# Patient Record
Sex: FEMALE | Race: WHITE
[De-identification: names, ages, dates, MRNs, and addresses within clinical notes are randomized per-mention and may not be internally consistent; named-entity substitution may affect disease eponyms.]

---

## 2017-05-16 ENCOUNTER — HOSPITAL ENCOUNTER (OUTPATIENT)
Dept: HOSPITAL 17 - HSDC | Age: 57
Setting detail: OBSERVATION
LOS: 1 days | Discharge: HOME | End: 2017-05-17
Attending: NEUROLOGICAL SURGERY | Admitting: NEUROLOGICAL SURGERY
Payer: COMMERCIAL

## 2017-05-16 VITALS
OXYGEN SATURATION: 94 % | TEMPERATURE: 97 F | HEART RATE: 64 BPM | RESPIRATION RATE: 17 BRPM | DIASTOLIC BLOOD PRESSURE: 83 MMHG | SYSTOLIC BLOOD PRESSURE: 117 MMHG

## 2017-05-16 VITALS
TEMPERATURE: 96 F | RESPIRATION RATE: 16 BRPM | SYSTOLIC BLOOD PRESSURE: 120 MMHG | DIASTOLIC BLOOD PRESSURE: 67 MMHG | OXYGEN SATURATION: 97 % | HEART RATE: 95 BPM

## 2017-05-16 VITALS
SYSTOLIC BLOOD PRESSURE: 126 MMHG | HEART RATE: 91 BPM | DIASTOLIC BLOOD PRESSURE: 70 MMHG | OXYGEN SATURATION: 96 % | RESPIRATION RATE: 18 BRPM | TEMPERATURE: 96.4 F

## 2017-05-16 VITALS
TEMPERATURE: 98 F | DIASTOLIC BLOOD PRESSURE: 78 MMHG | SYSTOLIC BLOOD PRESSURE: 122 MMHG | HEART RATE: 78 BPM | RESPIRATION RATE: 20 BRPM | OXYGEN SATURATION: 100 %

## 2017-05-16 VITALS
HEART RATE: 76 BPM | DIASTOLIC BLOOD PRESSURE: 77 MMHG | RESPIRATION RATE: 16 BRPM | OXYGEN SATURATION: 92 % | TEMPERATURE: 96.6 F | SYSTOLIC BLOOD PRESSURE: 117 MMHG

## 2017-05-16 VITALS — HEIGHT: 67 IN | BODY MASS INDEX: 35.64 KG/M2 | WEIGHT: 227.08 LBS

## 2017-05-16 DIAGNOSIS — Z88.0: ICD-10-CM

## 2017-05-16 DIAGNOSIS — M50.10: Primary | ICD-10-CM

## 2017-05-16 DIAGNOSIS — Z88.5: ICD-10-CM

## 2017-05-16 DIAGNOSIS — G25.81: ICD-10-CM

## 2017-05-16 DIAGNOSIS — Z88.1: ICD-10-CM

## 2017-05-16 DIAGNOSIS — F32.9: ICD-10-CM

## 2017-05-16 DIAGNOSIS — E03.9: ICD-10-CM

## 2017-05-16 PROCEDURE — 97161 PT EVAL LOW COMPLEX 20 MIN: CPT

## 2017-05-16 PROCEDURE — 76000 FLUOROSCOPY <1 HR PHYS/QHP: CPT

## 2017-05-16 PROCEDURE — C1713 ANCHOR/SCREW BN/BN,TIS/BN: HCPCS

## 2017-05-16 PROCEDURE — 72040 X-RAY EXAM NECK SPINE 2-3 VW: CPT

## 2017-05-16 PROCEDURE — 22856 TOT DISC ARTHRP 1NTRSPC CRV: CPT

## 2017-05-16 PROCEDURE — G0378 HOSPITAL OBSERVATION PER HR: HCPCS

## 2017-05-16 PROCEDURE — L0150 CERV SEMI-RIG ADJ MOLDED CHN: HCPCS

## 2017-05-16 PROCEDURE — L0172 CERV COL SR FOAM 2PC PRE OTS: HCPCS

## 2017-05-16 PROCEDURE — 94150 VITAL CAPACITY TEST: CPT

## 2017-05-16 RX ADMIN — SODIUM CHLORIDE, PRESERVATIVE FREE SCH ML: 5 INJECTION INTRAVENOUS at 21:00

## 2017-05-16 RX ADMIN — VANCOMYCIN HYDROCHLORIDE SCH MLS/HR: 1 INJECTION, SOLUTION INTRAVENOUS at 07:25

## 2017-05-16 RX ADMIN — CYCLOBENZAPRINE HYDROCHLORIDE SCH MG: 10 TABLET, FILM COATED ORAL at 17:48

## 2017-05-16 RX ADMIN — VANCOMYCIN HYDROCHLORIDE SCH: 1 INJECTION, SOLUTION INTRAVENOUS at 07:15

## 2017-05-16 RX ADMIN — DEXAMETHASONE SODIUM PHOSPHATE SCH MG: 4 INJECTION, SOLUTION INTRAMUSCULAR; INTRAVENOUS at 22:55

## 2017-05-16 RX ADMIN — CYCLOBENZAPRINE HYDROCHLORIDE SCH MG: 10 TABLET, FILM COATED ORAL at 14:25

## 2017-05-16 RX ADMIN — HYDROMORPHONE HYDROCHLORIDE PRN MG: 1 INJECTION, SOLUTION INTRAMUSCULAR; INTRAVENOUS; SUBCUTANEOUS at 22:54

## 2017-05-16 RX ADMIN — HYDROCODONE BITARTRATE AND ACETAMINOPHEN PRN TAB: 10; 325 TABLET ORAL at 14:26

## 2017-05-16 RX ADMIN — DOCUSATE SODIUM SCH MG: 100 CAPSULE, LIQUID FILLED ORAL at 20:26

## 2017-05-16 RX ADMIN — SODIUM CHLORIDE AND POTASSIUM CHLORIDE SCH MLS/HR: 9; 1.49 INJECTION, SOLUTION INTRAVENOUS at 20:29

## 2017-05-16 RX ADMIN — HYDROCODONE BITARTRATE AND ACETAMINOPHEN PRN TAB: 10; 325 TABLET ORAL at 17:49

## 2017-05-16 RX ADMIN — HYDROMORPHONE HYDROCHLORIDE PRN MG: 1 INJECTION, SOLUTION INTRAMUSCULAR; INTRAVENOUS; SUBCUTANEOUS at 20:27

## 2017-05-16 RX ADMIN — SODIUM CHLORIDE AND POTASSIUM CHLORIDE SCH MLS/HR: 9; 1.49 INJECTION, SOLUTION INTRAVENOUS at 12:18

## 2017-05-16 RX ADMIN — DEXAMETHASONE SODIUM PHOSPHATE SCH MG: 4 INJECTION, SOLUTION INTRAMUSCULAR; INTRAVENOUS at 13:00

## 2017-05-16 RX ADMIN — CEFAZOLIN SODIUM SCH MLS/HR: 2 SOLUTION INTRAVENOUS at 15:45

## 2017-05-16 RX ADMIN — DEXAMETHASONE SODIUM PHOSPHATE SCH MG: 4 INJECTION, SOLUTION INTRAMUSCULAR; INTRAVENOUS at 17:50

## 2017-05-16 RX ADMIN — ONDANSETRON PRN MG: 2 INJECTION, SOLUTION INTRAMUSCULAR; INTRAVENOUS at 20:27

## 2017-05-16 NOTE — PD.OP
__________________________________________________





Operative Report


Date of Surgery:  May 16, 2017


Preoperative Diagnosis:  


C3-4 cervical disk herniation


Postoperative Diagnosis:  


C3-4 cervical disk herniation


Procedure:


C3-C4 Anterior cervical discectomy and arhtoplasty with Mobi C


Anesthesia:


General


Surgeon:


Cezar Dorado


Assistant(s):


Lindy Falcon


Operation and Findings:





INDICATIONS FOR THE SURGERY


Ms Barth is a 56 year-old female who presented with intractable neck pain and 

clinical evidence of C7 cervical radiculopathy. She was found to have focal 

stenosis with a C6-7 causing significant mass effect on the nerve roots. She 

failed multiple modalities of nonsurgical treatment including physical therapy, 

analgesics, antiinflammatories, and pain management with multiple epidural 

steroid injections. The severity of her pain and symptoms were affecting her 

quality of life. An anterior cervical discectomy and arthroplasty were 

indicated.





The step-by-step details of the surgical procedure, indications, alternatives, 

risks and potential complications were fully discussed with the patient.  The 

patient fully understood.  All his questions were answered.  No guarantees were 

given.  She voiced requesting the procedure and signed informed consents.  She 

was offered the alternative of delaying the procedure and continuing with 

nonsurgical management.











DETAILS OF THE SURGICAL PROCEDURE





SURGICAL APPROACH


A skin incision was made along the middle cervical crease on the left side with 

a #10 blade.  The dissection was carried out through the platysma exposing the 

sternocleidomastoid muscle.  The cervical spine was approached following the 

fascial layers of the neck, just medial to the anterior border of the 

sternocleidomastoid and carotid sheath by a combination of sharp and dull 

dissection.  The omohyoid muscle was identified and carefully dissected 

laterally and the deep cervical fascia was carefully opened.  The longus colli 

muscles were retracted to each side of the midline. A marker was placed at the 

C3-C4 disc space and a cross-table lateral x-ray performed with a C-arm. An AP 

xray was then obtained as well, and the midline of the disk space was defined.





SURGICAL DECOMPRESSION


In order to decompress the anterior surface of the spinal cord it was necessary 

to perform a microsurgical resection of the disk. At this point in the 

procedure the operating microscope was draped in the usual sterile fashion and 

brought to the field.  The rest of the surgical procedure was performed using 

microdissection technique with the exception of the closure.





Under the operative microscopic a self-retaining retractor was placed 

underneath the longus colli muscle.  The annulus was incised with a #15 blade 

and microdiscectomy was then carefully carried out using angled curets and 

pituitary forceps. The patient had a large right disk extrusion which was 

producing mass affect on the exiting nerve root.  This was carefully dissected 

with a nerve hock and resected with a think foot plate 2 mm Kerrison under high 

magnification.  The posterior longitudinal ligament was then elevated with an 

angled curet and incised with a 15 bladed knife. A careful resection of the 

posterior longitudinal ligament was carried out using a thin footplate 2 mm 

Kerrison. Extruded disk fragments were causing mechanical compression over the 

exiting C6 nerve root were carefully dissected. The decompression was then 

carried out laterally, and a bilateral foraminotomy was performed with a 2 mm 

thin foot Kerrison. The epidural space was the systematically assessed with a 

nerve hook in search for disk fragments of scar tissue.  An excellent 

decompression was achieved in both, the dural sac and bilateral exiting nerve 

roots. The incision was then irrigated with a large amount of antibiotic 

solution





INTERBODY ARTHROPLASTY


In order to avoid collapse of the disk space which would result in bilateral 

foraminal stenosis, and in order to maintain disk space height and function 

minimally development of adjacent level degeneration, it was necessary to place 

an interbody device.





At this point of the procedure, gentle distraction was applied. The size of the 

interbody device was then assessed using a trial, and a cross table xray was 

done for confirmation of appropriate size and position of the device. Then the 

disk space was irrigated with antibiotic solution, and a 15mm by 6mm Mobi C 

artificial disk was carefully impacted into the disc space C3-C4. An excellent 

position of the device was achieved. This was confirmed anatomically by feeling 

the space posterior to the implant and distance to the anterior surface of the 

dural sac. Radiological confirmation of the position was performed with a cross 

table AP and lateral X-ray views, performed with the C-arm.





COMPLETION OF THE SURGICAL PROCEDURE





Once that each interbody device was in an appropriate position, the distraction 

was discontinued. The position of the device as well  as alignment of the spine 

were assessed anatomically by direct visualization, and radiologically by 

performing an AP and lateral X-ray of the cervical spine with the C-arm. The 

position of the implant was excellent.





The incision was irrigated with several liters of antibiotic solution. 

Hemostasis was achieved with a bipolar. A 7 mm Zana-Darling drain was left in 

the prevertebral space and externalized through a separate stab incision.  The 

incision was then closed in layers.  3-0 Vicryl with interrupted sutures was 

used to close the platysma and subcutaneous tissue. The skin was closed with 4-

0 running subcuticular Vicryl and Dermabond was applied to the skin.  The drain 

was secured with a 3-0 nylon.





At the end of the procedure the sponge, needle and instrument counts were all 

correct.  The estimated blood loss was less than 30 cc.  No blood transfusion 

was given.  No intraoperative complications occurred.  The patient received 

prophylactic antibiotics.  The patient was then extubated and transferred to 

the recovery room in stable condition.








Cezar Dorado MD May 16, 2017 13:26

## 2017-05-16 NOTE — RADRPT
EXAM DATE/TIME:  05/16/2017 09:51 

 

[facility] COMPARISON:     

No previous studies available for comparison.

 

                     

INDICATIONS :     

Artificial disk C3,C4.

                     

 

MEDICAL HISTORY :     

None.          

 

SURGICAL HISTORY :       

Fusion, cervical. 

 

ENCOUNTER:     

Initial                                        

 

ACUITY:     

1 day      

 

PAIN SCORE:     

Non-responsive.

 

LOCATION:       

Cervical spine.

 

FINDINGS:     

Artificial disc is present at C3-C4 in anatomic alignment. There is previous fusion at C5-C6.

 

CONCLUSION:     

1. Postsurgical changes as above.

 

 

 

 Ulices Mike MD on May 16, 2017 at 17:03           

Board Certified Radiologist.

 This report was verified electronically.

## 2017-05-17 VITALS
RESPIRATION RATE: 18 BRPM | TEMPERATURE: 98.2 F | SYSTOLIC BLOOD PRESSURE: 123 MMHG | OXYGEN SATURATION: 95 % | DIASTOLIC BLOOD PRESSURE: 75 MMHG | HEART RATE: 89 BPM

## 2017-05-17 VITALS
TEMPERATURE: 98.1 F | DIASTOLIC BLOOD PRESSURE: 87 MMHG | SYSTOLIC BLOOD PRESSURE: 130 MMHG | HEART RATE: 75 BPM | OXYGEN SATURATION: 98 % | RESPIRATION RATE: 16 BRPM

## 2017-05-17 VITALS
SYSTOLIC BLOOD PRESSURE: 121 MMHG | OXYGEN SATURATION: 94 % | TEMPERATURE: 97.6 F | RESPIRATION RATE: 18 BRPM | HEART RATE: 90 BPM | DIASTOLIC BLOOD PRESSURE: 79 MMHG

## 2017-05-17 RX ADMIN — DOCUSATE SODIUM SCH MG: 100 CAPSULE, LIQUID FILLED ORAL at 08:55

## 2017-05-17 RX ADMIN — HYDROMORPHONE HYDROCHLORIDE PRN MG: 1 INJECTION, SOLUTION INTRAMUSCULAR; INTRAVENOUS; SUBCUTANEOUS at 00:57

## 2017-05-17 RX ADMIN — CEFAZOLIN SODIUM SCH MLS/HR: 2 SOLUTION INTRAVENOUS at 00:57

## 2017-05-17 RX ADMIN — HYDROMORPHONE HYDROCHLORIDE PRN MG: 1 INJECTION, SOLUTION INTRAMUSCULAR; INTRAVENOUS; SUBCUTANEOUS at 06:10

## 2017-05-17 RX ADMIN — DEXAMETHASONE SODIUM PHOSPHATE SCH MG: 4 INJECTION, SOLUTION INTRAMUSCULAR; INTRAVENOUS at 06:10

## 2017-05-17 RX ADMIN — CEFAZOLIN SODIUM SCH MLS/HR: 2 SOLUTION INTRAVENOUS at 08:56

## 2017-05-17 RX ADMIN — HYDROMORPHONE HYDROCHLORIDE PRN MG: 1 INJECTION, SOLUTION INTRAMUSCULAR; INTRAVENOUS; SUBCUTANEOUS at 03:32

## 2017-05-17 RX ADMIN — SODIUM CHLORIDE, PRESERVATIVE FREE SCH ML: 5 INJECTION INTRAVENOUS at 08:57

## 2017-05-17 RX ADMIN — ONDANSETRON PRN MG: 2 INJECTION, SOLUTION INTRAMUSCULAR; INTRAVENOUS at 03:32

## 2017-05-17 RX ADMIN — HYDROCODONE BITARTRATE AND ACETAMINOPHEN PRN TAB: 10; 325 TABLET ORAL at 08:55

## 2017-05-17 RX ADMIN — SODIUM CHLORIDE AND POTASSIUM CHLORIDE SCH MLS/HR: 9; 1.49 INJECTION, SOLUTION INTRAVENOUS at 08:58

## 2017-05-17 RX ADMIN — DEXAMETHASONE SODIUM PHOSPHATE SCH MG: 4 INJECTION, SOLUTION INTRAMUSCULAR; INTRAVENOUS at 12:18

## 2017-05-17 RX ADMIN — CYCLOBENZAPRINE HYDROCHLORIDE SCH MG: 10 TABLET, FILM COATED ORAL at 12:18

## 2017-05-17 RX ADMIN — CYCLOBENZAPRINE HYDROCHLORIDE SCH MG: 10 TABLET, FILM COATED ORAL at 08:56

## 2017-05-17 NOTE — HHI.DS
Discharge Summary


Admission Date


May 16, 2017 at 12:21


Discharge Date:  May 17, 2017


Admitting Diagnosis


hnp





(1) Status post cervical arthrodesis


ICD Code:  Z98.1


Procedures


arthroplasty


Brief History


elective arthroplasty, no complications


Hospital Course


Pt recovered without complications


Pt Condition on Discharge:  Good


Discharge Disposition:  Discharge Home


Discharge Instructions


DIET: Follow Instructions for:  As Tolerated, No Restrictions


Speech Therapy-Diet Recommenda:  Mechanical Soft


ADDITIONAL Diet Instructions:  


no


ACTIVITIES You can perform:  Weight Bearing As Michael


Activities to Avoid:  Lifting/Bending








Cezar Dorado MD May 17, 2017 09:29

## 2017-05-17 NOTE — HHI.NSPN
__________________________________________________





Note Status


Status:  Progress Note





Interval History


Diagnosis


ARthroplasty


Interval History


5/17. Doing well, S?P arthroplasty. doing well





Labs, Micro, & Vital Signs


Results











  Date Time  Temp Pulse Resp B/P Pulse Ox O2 Delivery O2 Flow Rate FiO2


 


5/17/17 04:30 98.1 75 16 130/87 98   


 


5/17/17 04:00      Nasal Cannula 2.00 


 


5/17/17 00:00      Nasal Cannula 2.00 


 


5/16/17 23:50 96.6 76 16 117/77 92   


 


5/16/17 20:50 97.0 64 17 117/83 94   


 


5/16/17 20:00      Nasal Cannula 2.00 


 


5/16/17 16:00 96.4 91 18 126/70 96   


 


5/16/17 14:20 96.0 95 16 120/67 97   


 


5/16/17 14:10      Nasal Cannula 2.00 


 


5/16/17 13:52 97.7 92 20 143/76 98 Nasal Cannula 2 


 


5/16/17 13:45  92 20 143/76 98 Nasal Cannula 2 


 


5/16/17 13:30  89 20 146/80 98 Nasal Cannula 2 


 


5/16/17 13:15  86 20 143/79 99 Nasal Cannula 2 


 


5/16/17 13:00  92 20 143/82 99 Nasal Cannula 2 


 


5/16/17 12:45  87 20 152/85 99 Nasal Cannula 2 


 


5/16/17 12:30  87 20 153/87 99 Nasal Cannula 2 


 


5/16/17 12:15  88 20 152/85 99 Nasal Cannula 2 


 


5/16/17 11:56 98.1 94 20 145/87 98 Nasal Cannula 2 














 5/17/17





 07:00


 


Intake Total 4210 ml


 


Output Total 1320 ml


 


Balance 2890 ml








Constitutional





Vital Signs








  Date Time  Temp Pulse Resp B/P Pulse Ox O2 Delivery O2 Flow Rate FiO2


 


5/17/17 04:30 98.1 75 16 130/87 98   


 


5/17/17 04:00      Nasal Cannula 2.00 


 


5/17/17 00:00      Nasal Cannula 2.00 


 


5/16/17 23:50 96.6 76 16 117/77 92   


 


5/16/17 20:50 97.0 64 17 117/83 94   


 


5/16/17 20:00      Nasal Cannula 2.00 


 


5/16/17 16:00 96.4 91 18 126/70 96   


 


5/16/17 14:20 96.0 95 16 120/67 97   


 


5/16/17 14:10      Nasal Cannula 2.00 


 


5/16/17 13:52 97.7 92 20 143/76 98 Nasal Cannula 2 


 


5/16/17 13:45  92 20 143/76 98 Nasal Cannula 2 


 


5/16/17 13:30  89 20 146/80 98 Nasal Cannula 2 


 


5/16/17 13:15  86 20 143/79 99 Nasal Cannula 2 


 


5/16/17 13:00  92 20 143/82 99 Nasal Cannula 2 


 


5/16/17 12:45  87 20 152/85 99 Nasal Cannula 2 


 


5/16/17 12:30  87 20 153/87 99 Nasal Cannula 2 


 


5/16/17 12:15  88 20 152/85 99 Nasal Cannula 2 


 


5/16/17 11:56 98.1 94 20 145/87 98 Nasal Cannula 2 














 5/17/17





 07:00


 


Intake Total 4210 ml


 


Output Total 1320 ml


 


Balance 2890 ml











Review of Systems/Exam


Exam


She is alert, awake and oriented to time, place and person.  Speech is fluent.





Cranial nerve examination: pupils to be equal, round and reactive to light.  

Extra-ocular movements are intact.


Facial motor and sensory function are normal and symmetrical.  Gross hearing 

appears intact.


Sternocleidomastoid and trapezius muscles are symmetrical.  Other cranial 

nerves are intact.





Neck is soft on [place] collar





Muscle strength is normal in all muscle groups of both upper and lower 

extremities.





Sensory examination is intact to light touch and pin prick in both the upper 

and lower extremities.





Deep tendon reflexes are symmetrical in both upper and lower extremities.  

There is a bilateral plantar flexion response.





Cerebellar examination is unremarkable, without deficits.





Medications


Current Medications





 Current Medications


Lactated Ringer's 1,000 ml @  30 mls/hr Q24H PRN IV SEE LABEL COMMENTS Last 

administered on 5/16/17at 07:07;  Start 5/16/17 at 07:00;  Stop 5/16/17 at 12:31

;  Status DC


Sodium Chloride ( ml Inj) 500 ml @  30 mls/hr [id number] PRN IV SEE LABEL 

COMMENTS;  Start 5/16/17 at 07:00;  Stop 5/16/17 at 12:31;  Status DC


Metoprolol Tartrate (Lopressor) 25 mg ON CALL  PRN PO SEE LABEL COMMENTS;  

Start 5/16/17 at 07:00;  Stop 5/17/17 at 14:19;  Status DC


Povidone Iodine (Betadine 5% Antisepsis Kit) 1 applic ON CALL  PRN EACH NARE 

SEE LABEL COMMENTS Last administered on 5/16/17at 07:07;  Start 5/16/17 at 07:00

;  Stop 5/17/17 at 14:19;  Status DC


Chlorhexidine Gluconate (Chlorhexidine 2% Cloth) 3 pack ON CALL  PRN TOPICAL 

SEE LABEL COMMENTS;  Start 5/16/17 at 07:00;  Stop 5/17/17 at 14:19;  Status DC


Insulin Human Regular See Protocol Table ... ON CALL  PRN SQ SEE PROTOCOL TABLE

;  Start 5/16/17 at 07:00;  Stop 5/17/17 at 14:19;  Status DC


Sodium Chloride 1,000 ml @  30 mls/hr Q24H IV ;  Start 5/16/17 at 07:00;  Stop 5 /16/17 at 12:31;  Status DC


Vancomycin HCl/ Sodium Chloride (Vancomycin Inj/  ml Inj) 250 ml @  250 

mls/hr ON  CALL IV  Last administered on 5/16/17at 07:15;  Start 5/16/17 at 07:

00;  Stop 5/16/17 at 12:33;  Status DC


Microfibriller Collagen Hemostat (Avitene Bandage) 1 bandage STK-MED ONCE 

.ROUTE  Last administered on 5/16/17at 10:25;  Start 5/16/17 at 07:22;  Stop 5/ 16/17 at 07:23;  Status DC


Thrombin 73137 units 10,000 units STK-MED ONCE .ROUTE  Last administered on 5/16 /17at 10:25;  Start 5/16/17 at 07:22;  Stop 5/16/17 at 07:23;  Status DC


Cefazolin Sodium/ Dextrose (Ancef 2 Gm Premix) 50 ml @ As Directed STK-MED ONCE 

.ROUTE  Last administered on 5/16/17at 09:14;  Start 5/16/17 at 07:22;  Stop 5/ 16/17 at 07:23;  Status DC


Gelatin (Gelfoam 100 Top) 1 foam STK-MED ONCE .ROUTE  Last administered on 5/16/ 17at 10:24;  Start 5/16/17 at 07:22;  Stop 5/16/17 at 07:23;  Status DC


Gentamicin Sulfate (Gentamicin Inj) 240 mg STK-MED ONCE .ROUTE  Last 

administered on 5/16/17at 10:24;  Start 5/16/17 at 07:23;  Stop 5/16/17 at 07:24

;  Status DC


Acetaminophen (Ofirmev Inj) 1,000 mg STK-MED ONCE IV ;  Start 5/16/17 at 08:05;

  Stop 5/16/17 at 08:06;  Status DC


Artificial Tears (Lacrilube Opht Oint) 3.5 applic STK-MED ONCE .ROUTE ;  Start 5 /16/17 at 08:05;  Stop 5/16/17 at 08:06;  Status DC


Famotidine (Pepcid Inj) 20 mg STK-MED ONCE .ROUTE ;  Start 5/16/17 at 08:05;  

Stop 5/16/17 at 08:06;  Status DC


Midazolam HCl (Versed Inj) 4 mg STK-MED ONCE .ROUTE ;  Start 5/16/17 at 08:06;  

Stop 5/16/17 at 08:07;  Status DC


Fentanyl Citrate (fentaNYL INJ) 500 mcg STK-MED ONCE .ROUTE ;  Start 5/16/17 at 

08:06;  Stop 5/16/17 at 08:07;  Status DC


Midazolam HCl (Versed Inj) 2 mg STK-MED ONCE .ROUTE ;  Start 5/16/17 at 08:32;  

Stop 5/16/17 at 08:33;  Status DC


Miscellaneous Information ALL NURSING DEPARTME... UNSCH  PRN .XX SEE LABEL 

COMMENTS;  Start 5/16/17 at 12:15;  Stop 5/17/17 at 12:14;  Status DC


Potassium Chloride/Sodium Chloride (NS + KCl 20 Meq Inj) 1,000 ml @  100 mls/hr 

Q10H IV  Last administered on 5/17/17at 08:58;  Start 5/16/17 at 12:18;  Stop 5/ 17/17 at 14:19;  Status DC


IV Flush (NS Flush) 2 ml UNSCH  PRN IVF FLUSH AFTER USING IV ACCESS;  Start 5/16 /17 at 12:30;  Stop 5/17/17 at 14:19;  Status DC


IV Flush 2 ml 2 ml BID IVF ;  Start 5/16/17 at 21:00;  Stop 5/17/17 at 14:19;  

Status DC


Cefazolin Sodium/ Dextrose (Ancef 2 Gm Premix) 50 ml @  100 mls/hr Q8H IV  Last 

administered on 5/17/17at 08:56;  Start 5/16/17 at 17:00;  Stop 5/17/17 at 09:29

;  Status DC


Lorazepam (Ativan Inj) 1 mg Q1H  PRN IVP anxiety/irritability;  Start 5/16/17 

at 12:30;  Stop 5/17/17 at 14:19;  Status DC


Docusate Sodium (Colace) 100 mg BID PO  Last administered on 5/17/17at 08:55;  

Start 5/16/17 at 21:00;  Stop 5/17/17 at 14:19;  Status DC


Pantoprazole Sodium (Protonix) 40 mg DAILY PO  Last administered on 5/17/17at 08

:55;  Start 5/17/17 at 09:00;  Stop 5/17/17 at 14:19;  Status DC


Acetaminophen/ Hydrocodone Bitart (Norco  Mg) 1 tab Q4H  PRN PO PAIN 

SCALE 1 TO 5 Last administered on 5/17/17at 08:55;  Start 5/16/17 at 12:30;  

Stop 5/17/17 at 14:19;  Status DC


Acetaminophen/ Hydrocodone Bitart (Norco  Mg) 2 tab Q4H  PRN PO PAIN 

SCALE 6 TO 10;  Start 5/16/17 at 12:30;  Stop 5/17/17 at 14:19;  Status DC


Morphine Sulfate (Morphine Inj) 2 mg Q2H  PRN IV PUSH PAIN SCALE 1 TO 6;  Start 

5/16/17 at 12:30;  Stop 5/17/17 at 14:19;  Status DC


Morphine Sulfate (Morphine Inj) 4 mg Q2H  PRN IV PUSH PAIN SCALE 7 TO 10;  

Start 5/16/17 at 12:30;  Stop 5/17/17 at 14:19;  Status DC


Acetaminophen (Tylenol) 650 mg Q4H  PRN PO TEMPERATURE > 101.5 F;  Start 5/16/ 17 at 12:30;  Stop 5/17/17 at 14:19;  Status DC


Dexamethasone Sodium Phosphate (Decadron Inj) 4 mg Q6HR IV PUSH  Last 

administered on 5/17/17at 12:18;  Start 5/16/17 at 13:00;  Stop 5/17/17 at 14:19

;  Status DC


Alprazolam (Xanax) 1 mg HS PO  Last administered on 5/16/17at 20:27;  Start 5/16 /17 at 21:00;  Stop 5/17/17 at 14:19;  Status DC


Citalopram Hydrobromide (CeleXA) 20 mg DAILY PO  Last administered on 5/17/17at 

08:55;  Start 5/17/17 at 09:00;  Stop 5/17/17 at 14:19;  Status DC


Cyclobenzaprine HCl (Flexeril) 10 mg TID PO  Last administered on 5/17/17at 12:

18;  Start 5/16/17 at 13:00;  Stop 5/17/17 at 14:19;  Status DC


Ropinirole HCl (Requip) 1 mg QID PO  Last administered on 5/17/17at 12:18;  

Start 5/16/17 at 13:00;  Stop 5/17/17 at 14:19;  Status DC


Sumatriptan Succinate (Imitrex) 100 mg ONCE  PRN PO MIGRAINE HEADACHE;  Start 5/ 16/17 at 12:30;  Stop 5/17/17 at 14:19;  Status DC


Non-Formulary Medication 137 mcg DAILY PO THY;  Start 5/17/17 at 09:00;  Status 

UNV


Hydromorphone HCl (*DILAUDID PF INJ PERIprocedural ONLY) 1 mg STK-MED ONCE 

.ROUTE  Last administered on 5/16/17at 12:52;  Start 5/16/17 at 12:52;  Stop 5/ 16/17 at 12:53;  Status DC


Levothyroxine Sodium (Synthroid) 112 mcg DAILY@06 PO  Last administered on 5/17/ 17at 06:08;  Start 5/17/17 at 06:00;  Stop 5/17/17 at 14:19;  Status DC


Levothyroxine Sodium (Synthroid) 25 mcg DAILY@06 PO  Last administered on 5/17/ 17at 06:08;  Start 5/17/17 at 06:00;  Stop 5/17/17 at 14:19;  Status DC


Hydromorphone HCl (*DILAUDID PF INJ PERIprocedural ONLY) 1 mg STK-MED ONCE 

.ROUTE  Last administered on 5/16/17at 13:07;  Start 5/16/17 at 13:07;  Stop 5/ 16/17 at 13:08;  Status DC


Hydromorphone HCl (Dilaudid Pf Inj) 1 mg Q2H  PRN IV PAIN Last administered on 5 /17/17at 06:10;  Start 5/16/17 at 20:30;  Stop 5/17/17 at 14:19;  Status DC


Ondansetron HCl (Zofran Inj) 4 mg Q6H  PRN IV PUSH NAUSEA Last administered on 5 /17/17at 03:32;  Start 5/16/17 at 20:30;  Stop 5/17/17 at 14:19;  Status DC


Sumatriptan Succinate (Imitrex) 100 mg ONCE  ONCE PO  Last administered on 5/17/ 17at 09:19;  Start 5/17/17 at 09:00;  Stop 5/17/17 at 09:03;  Status DC





Attending Statement


She is doing well. No complications. No deficits.


Will discharge home








eCzar Dorado MD May 17, 2017 09:31

## 2018-02-21 ENCOUNTER — HOSPITAL ENCOUNTER (EMERGENCY)
Dept: HOSPITAL 17 - PHED | Age: 58
Discharge: HOME | End: 2018-02-21
Payer: COMMERCIAL

## 2018-02-21 VITALS — WEIGHT: 220.46 LBS | HEIGHT: 67 IN | BODY MASS INDEX: 34.6 KG/M2

## 2018-02-21 VITALS
HEART RATE: 69 BPM | RESPIRATION RATE: 18 BRPM | OXYGEN SATURATION: 100 % | DIASTOLIC BLOOD PRESSURE: 81 MMHG | SYSTOLIC BLOOD PRESSURE: 136 MMHG | TEMPERATURE: 98 F

## 2018-02-21 VITALS — SYSTOLIC BLOOD PRESSURE: 136 MMHG | DIASTOLIC BLOOD PRESSURE: 82 MMHG

## 2018-02-21 VITALS — RESPIRATION RATE: 14 BRPM

## 2018-02-21 DIAGNOSIS — Z88.1: ICD-10-CM

## 2018-02-21 DIAGNOSIS — M46.90: ICD-10-CM

## 2018-02-21 DIAGNOSIS — Z88.0: ICD-10-CM

## 2018-02-21 DIAGNOSIS — M54.5: Primary | ICD-10-CM

## 2018-02-21 DIAGNOSIS — F32.9: ICD-10-CM

## 2018-02-21 DIAGNOSIS — Z98.84: ICD-10-CM

## 2018-02-21 DIAGNOSIS — Z88.5: ICD-10-CM

## 2018-02-21 PROCEDURE — 96375 TX/PRO/DX INJ NEW DRUG ADDON: CPT

## 2018-02-21 PROCEDURE — 96374 THER/PROPH/DIAG INJ IV PUSH: CPT

## 2018-02-21 PROCEDURE — 96361 HYDRATE IV INFUSION ADD-ON: CPT

## 2018-02-21 PROCEDURE — 72100 X-RAY EXAM L-S SPINE 2/3 VWS: CPT

## 2018-02-21 PROCEDURE — 99284 EMERGENCY DEPT VISIT MOD MDM: CPT

## 2018-02-21 NOTE — PD
HPI


Chief Complaint:  Back/ Neck Pain or Injury


Time Seen by Provider:  04:36


Travel History


International Travel<30 days:  No


Contact w/Intl Traveler<30days:  No


Traveled to known affect area:  No





History of Present Illness


HPI


The patient is a 57-year-old  female who presents to the emergency 

department for low back pain.  The patient states her back pain started 2 days 

ago, she thought she may have "pulled" her back.  However, she tried to get up 

to go to the restroom earlier tonight and had difficulty walking.  The pain is 

located lower aspect of the back, across the sacroiliac area radiating down the 

legs but stopping prior to the knee.  She denies any weakness, numbness, or 

tingling of the lower extremities.  She does have a history of herniated disc 

in the neck before with previous surgery by Dr. Dorado.  She does note mild 

urinary incontinence, however, this has been somewhat chronic is not acute.  

The patient's symptoms are moderate, there are no current alleviating or 

exacerbating factors.  She does note a history of allergies to morphine with 

rash and states that Toradol has an interaction with her Requip, making her 

restless.  Patient states she is able to take Demerol and Dilaudid for pain.





PFSH


Past Medical History


Arthritis:  Yes (BACK AND NECK)


Blood Disorders:  No


Anxiety:  Yes


Depression:  Yes ("MOOD SWINGS")


Heart Rhythm Problems:  No


Cancer:  No


Cardiovascular Problems:  No


High Cholesterol:  No


Congestive Heart Failure:  No


Cerebrovascular Accident:  No


Diabetes:  No


Diminished Hearing:  No


Endocrine:  Yes


Gastrointestinal Disorders:  Yes (GASTRIC BYPASS SX )


Glaucoma:  No


Genitourinary:  No


Hepatitis:  No


Hiatal Hernia:  No


Hypertension:  No


Immune Disorder:  No


Implanted Vascular Access Dvce:  No


Medical other:  Yes


Musculoskeletal:  Yes (ARTHRITIS HIPS, LUMBAR SPINE, CERVICAL SPINE, FEET)


Neurologic:  Yes (NUMBNESS & BURNING ENTIRE RIGHT ARM INTO FINGERS INTERMITT)


Psychiatric:  Yes (DEPRESSION )


Reproductive:  No


Respiratory:  No


Seizures:  No


Thyroid Disease:  Yes (HYPO)


Tetanus Vaccination:  < 5 Years


Influenza Vaccination:  Yes


Pregnant?:  Not Pregnant


Menopausal:  Yes


:  2


Para:  2


Tubal Ligation:  Yes





Past Surgical History


Abdominal Surgery:  Yes (GASTRIC BYPASS )


AICD:  No


Body Medical Devices:  fussion to cervical


Cardiac Surgery:  No


Ear Surgery:  No


Endocrine Surgery:  No


Eye Surgery:  No


Genitourinary Surgery:  No


Gynecologic Surgery:  Yes (PARTIAL HYSTERECTOMY WITH CYSTOCELE & RECTOCELE )


Hysterectomy:  Yes


Joint Replacement:  No


Neurologic Surgery:  Yes (4/17/15 C5-6, 6-7 fusion)


Oral Surgery:  Yes (WISDOM TEETH REMOVED)


Pacemaker:  No


Thoracic Surgery:  No


Other Surgery:  Yes ( C5-6, 6-7 fusion)





Social History


Alcohol Use:  Yes (occasional)


Tobacco Use:  No


Substance Use:  No





Allergies-Medications


(Allergen,Severity, Reaction):  


Coded Allergies:  


     ciprofloxacin (Unverified  Allergy, Severe, UNKNOWN, 18)


 INCOMPATABLE WITH REQUIP


     penicillin G (Unverified  Allergy, Severe, HIVES, 18)


     morphine (Unverified  Allergy, Intermediate, Irritability/Anxiety, 18)


Reported Meds & Prescriptions





Reported Meds & Active Scripts


Active


Zanaflex (Tizanidine HCl) 4 Mg Cap 4 Mg PO TID


Reported


Meloxicam 7.5 Mg Tab 7.5 Mg PO DAILY


Methocarbamol 500 Mg Tab 500 Mg PO HS


Xanax (Alprazolam) 1 Mg Tab 1 Mg PO HS


Celexa (Citalopram Hydrobromide) 20 Mg Tab 20 Mg PO DAILY


Levothyroxine (Levothyroxine Sodium) 137 Mcg Tab 137 Mcg PO DAILY


Requip (Ropinirole) 1 Mg Tab 1 Mg PO QID


Imitrex (Sumatriptan Succinate) 100 Mg Tab 100 Mg PO ONCE PRN


     If a satisfactory response has not been obtained at 2 hours, a second


     dose may be administered








Review of Systems


Except as stated in HPI:  all other systems reviewed are Neg


General / Constitutional:  No: Fever


Cardiovascular:  No: Chest Pain or Discomfort


Respiratory:  No: Shortness of Breath


Gastrointestinal:  No: Nausea, Vomiting


Genitourinary:  No: Dysuria


Musculoskeletal:  Positive: Pain, No: Weakness


Neurologic:  No: Paresthesia, Sensory Disturbance





Physical Exam


Narrative


GENERAL: Awake, alert, pleasant 57-year-old female who appears her stated age 

and appears in moderate discomfort.


SKIN: Focused skin assessment warm/dry.


HEAD: Atraumatic. Normocephalic. 


EYES: No injection or drainage.


Back: Tenderness of the sacroiliac bilaterally as well as mid lumbar spine.  No 

obvious deformity.  Pain elicited with rotation of the thorax.


MUSCULOSKELETAL: No obvious deformities. No clubbing.  No cyanosis.  No edema.  

Plantar flexion bilaterals 5 over 5.  Dorsi flexion is 5 out of 5.  Extension 

of the knee is 5 out of 5.  Flexion the hips is 5 out of 5 but exacerbates her 

pain.


NEUROLOGICAL: Awake and alert. No obvious cranial nerve deficits.  Motor 

grossly within normal limits. Normal speech.  Sensation is intact to the lower 

extremities bilaterally.


PSYCHIATRIC: Appropriate mood and affect; insight and judgment normal.





Data


Data


Last Documented VS





Vital Signs








  Date Time  Temp Pulse Resp B/P (MAP) Pulse Ox O2 Delivery O2 Flow Rate FiO2


 


18 04:11 98.0 69 18 136/81 (99) 100   








Orders





 Orders


Dexamethasone Inj (Decadron Inj) (18 09:00)


Ondansetron Inj (Zofran Inj) (18 04:45)


Sodium Chlorid 0.9% 500 Ml Inj (Ns 500 M (18 04:45)


Spine, Lumbar - Ltd (Ap & Lat) (18 )


Hydromorphone Pf Inj (Dilaudid Pf Inj) (18 05:15)


Dexamethasone Inj (Decadron Inj) (18 05:15)








MDM


Medical Decision Making


Medical Screen Exam Complete:  Yes


Emergency Medical Condition:  Yes


Medical Record Reviewed:  Yes


Interpretation(s)


X-ray lumbar spine reveals moderate degenerative disc changes at the L5-S1 

level.  The study is otherwise unremarkable.


Differential Diagnosis


Differential diagnosis includes back pain, back strain, compression fracture, 

herniated disc, spinal stenosis, cauda equina syndrome, UTI, AAA.


Narrative Course


IV was established and the patient was administered Decadron, Dilaudid, and 

Zofran with IV fluids.  Lumbar spine x-ray was ordered.  X-ray reveals moderate 

degenerative disc changes at L5-S1 level, otherwise unremarkable.  The patient 

was reevaluated, her symptoms have improved.  Patient will be placed on Medrol 

Dosepak and Percocet.  She is advised to stop Mobitz while on the Medrol 

Dosepak.  Continue muscle relaxers as previously directed.  Work excuse for 2 

days.  She is advised to follow-up with her primary physician.  She will be 

provided a copy of her x-ray results at discharge.





Diagnosis





 Primary Impression:  


 Back pain


 Qualified Codes:  M54.5 - Low back pain


Patient Instructions:  General Instructions, Narcotic given in the ED





***Additional Instructions:  


Please provide the patient a copy of her x-ray results at discharge.  Work 

excuse for 2 days.  Stop Mobic while on Medrol Dosepak.  Continue muscle 

relaxers as previously directed.  Return if symptoms worsen or progress.


***Med/Other Pt SpecificInfo:  Prescription(s) given


Scripts


Oxycodone-Acetaminophen (Percocet) 5-325 mg Tab


1 TAB PO Q6H Y for PAIN, #15 TAB 0 Refills


   Prov: Suleman Arora MD         18 


Methylprednisolone Dosepak (Medrol Dosepak) 4 Mg Dspk


4 MG PO AS DIRECTED, #1 DSPK 0 Refills


   Per Pharmacist direction


   Prov: Suleman Arora MD         18


Disposition:  01 DISCHARGE HOME


Condition:  Stable











Suleman Arora MD 2018 04:52

## 2018-02-21 NOTE — RADRPT
EXAM DATE/TIME:  02/21/2018 04:52 

 

[facility] COMPARISON:     

No previous studies available for comparison.

 

                     

INDICATIONS :     

Lower back pain from unknown injury.

                     

 

MEDICAL HISTORY :     

None.          

 

SURGICAL HISTORY :     

None.   

 

ENCOUNTER:     

Initial                                        

 

ACUITY:     

1 day      

 

PAIN SCORE:     

10/10

 

LOCATION:     

Bilateral  lower back.

 

FINDINGS:     

Two view examination was performed.  There are five non-rib bearing vertebral bodies.  The vertebral 
bodies are in normal alignment without evidence of subluxation or scoliosis. Degenerative disc change
s are present at the L5-S1 level with disc space narrowing, sclerosis and mild spurring. The sacroili
ac joints are symmetric and intact. The pedicles are intact.  Bony mineralization is normal.  No frac
ture is identified.

 

CONCLUSION:     

1. Moderate degenerative disc change at the L5-S1 level.

2. The study is otherwise unremarkable.

 

 

 

 Elliott Montoya MD on February 21, 2018 at 5:29           

Board Certified Radiologist.

 This report was verified electronically.